# Patient Record
Sex: MALE | Race: WHITE
[De-identification: names, ages, dates, MRNs, and addresses within clinical notes are randomized per-mention and may not be internally consistent; named-entity substitution may affect disease eponyms.]

---

## 2019-10-13 ENCOUNTER — HOSPITAL ENCOUNTER (EMERGENCY)
Dept: HOSPITAL 76 - ED | Age: 24
Discharge: HOME | End: 2019-10-13
Payer: COMMERCIAL

## 2019-10-13 VITALS — SYSTOLIC BLOOD PRESSURE: 134 MMHG | DIASTOLIC BLOOD PRESSURE: 85 MMHG

## 2019-10-13 DIAGNOSIS — H66.003: ICD-10-CM

## 2019-10-13 DIAGNOSIS — J01.00: Primary | ICD-10-CM

## 2019-10-13 PROCEDURE — 99282 EMERGENCY DEPT VISIT SF MDM: CPT

## 2019-10-13 PROCEDURE — 99284 EMERGENCY DEPT VISIT MOD MDM: CPT

## 2019-10-13 NOTE — ED PHYSICIAN DOCUMENTATION
PD HPI URI





- Stated complaint


Stated Complaint: CONGESTION/COUGH/HEAD PRESSURE





- Chief complaint


Chief Complaint: Resp





- History obtained from


History obtained from: Patient, Family





- History of Present Illness


Timing - onset: Yesterday


Timing duration: Days (2)


Timing details: Gradual onset, Still present


Associated symptoms: Fever, Chills, Ear pain, Nasal congestion, Rhinorrhea, 

Sinus pain, Sore throat, Dry cough


Contributing factors: Sick contact


Improves by: Rest, Medication


Worsened by: Activity


Similar symptoms before: Has not had sx before


Recently seen: Not recently seen





- Additional information


Additional information: 


Previously well 24-year-old male has developed a cough and congestion with a 

feeling of cotton in both of his ears sinus pressure and pain and a sore throat.

 He took some Tylenol earlier in the day and he has not taken any since.  He has

developed fever and he aches everywhere.





Review of Systems


Constitutional: reports: Fever, Chills, Myalgias, Fatigue


Eyes: denies: Decreased vision


Ears: reports: Ear pain


Nose: reports: Rhinorrhea / runny nose, Congestion, Sinus pressure / pain


Throat: reports: Sore throat


Cardiac: denies: Chest pain / pressure, Palpitations


Respiratory: reports: Cough.  denies: Dyspnea


GI: denies: Vomiting





PD PAST MEDICAL HISTORY





- Present Medications


Home Medications: 


                                Ambulatory Orders











 Medication  Instructions  Recorded  Confirmed


 


Azithromycin [Zithromax] 250 mg PO DAILY #4 tablet 10/13/19 














- Allergies


Allergies/Adverse Reactions: 


                                    Allergies











Allergy/AdvReac Type Severity Reaction Status Date / Time


 


No Known Drug Allergies Allergy   Verified 10/13/19 00:35














PD ED PE NORMAL





- Vitals


Vital signs reviewed: Yes (febrile and hypertensive)





- General


General: Alert and oriented X 3, No acute distress, Well developed/nourished





- HEENT


HEENT: Atraumatic, PERRL, EOMI, Pharynx benign, Other (both TM's are inflamed 

the right is worse than the left. There is bilat maxillary sinus point 

tenderness. )





- Neck


Neck: Supple, no meningeal sign, No bony TTP





- Cardiac


Cardiac: RRR, No murmur





- Respiratory


Respiratory: No respiratory distress, Clear bilaterally





- Abdomen


Abdomen: Soft, Non tender





- Back


Back: No CVA TTP, No spinal TTP





- Derm


Derm: Normal color, Warm and dry, No rash





- Extremities


Extremities: No deformity, Normal ROM s pain, No edema, No calf tenderness / 

cord





- Neuro


Neuro: Alert and oriented X 3, CNs 2-12 intact, No motor deficit, No sensory 

deficit, Normal speech


Eye Opening: Spontaneous


Motor: Obeys Commands


Verbal: Oriented


GCS Score: 15





- Psych


Psych: Normal mood, Normal affect





Results





- Vitals


Vitals: 





                               Vital Signs - 24 hr











  10/13/19





  00:30


 


Temperature 37.9 C H


 


Heart Rate 100


 


Respiratory 18





Rate 


 


Blood Pressure 143/82 H


 


O2 Saturation 99








                                     Oxygen











O2 Source                      Room air

















PD MEDICAL DECISION MAKING





- ED course


Complexity details: reviewed results, re-evaluated patient, considered 

differential, d/w patient, d/w family


ED course: 





24-year-old male with acute febrile illness has otitis on examination appears to

 have sinusitis with sinus point tenderness as well he is administered Dex 

Methasone 10 mg orally and given 500 mg of a azithromycin as well as Tylenol.  

We will place him on a course of azithromycin.





Departure





- Departure


Disposition: 01 Home, Self Care


Clinical Impression: 


Otitis media


Qualifiers:


 Otitis media type: suppurative Chronicity: acute Laterality: bilateral 

Recurrence: non-recurrent Spontaneous tympanic membrane rupture: without 

spontaneous rupture Qualified Code(s): H66.003 - Acute suppurative otitis media 

without spontaneous rupture of ear drum, bilateral





Sinusitis


Qualifiers:


 Sinusitis location: maxillary Chronicity: acute Recurrence: non-recurrent 

Qualified Code(s): J01.00 - Acute maxillary sinusitis, unspecified





Condition: Stable


Instructions:  ED Sinusitis Abx Tx, ED Otitis Media Acute Adult


Follow-Up: 


[facility] [Provider Group]


Prescriptions: 


Azithromycin [Zithromax] 250 mg PO DAILY #4 tablet


Forms:  Activity restrictions

## 2020-10-27 ENCOUNTER — HOSPITAL ENCOUNTER (OUTPATIENT)
Dept: HOSPITAL 76 - SC | Age: 25
Discharge: HOME | End: 2020-10-27
Attending: INTERNAL MEDICINE
Payer: COMMERCIAL

## 2020-10-27 DIAGNOSIS — G47.10: Primary | ICD-10-CM

## 2020-10-27 DIAGNOSIS — R41.89: ICD-10-CM

## 2020-10-27 DIAGNOSIS — R51.9: ICD-10-CM

## 2020-10-27 DIAGNOSIS — R06.83: ICD-10-CM

## 2020-10-27 DIAGNOSIS — G47.8: ICD-10-CM

## 2020-10-27 NOTE — SLEEP CARE CONSULTATION
Information from patient questionnaire entered by Bree Carter.





I have reviewed and concur with the information entered by Bree Carter. This 

document represents the service I personally performed and the decisions made by

me, Gopi Ortiz MD, [facility].





History of Present Illness


Service Date and Time: 10/27/2020    0940


Reason for Visit: New patient


Chief Complaint: reports: Insomnia, Unrefreshed sleep, Fatigue, Frequent 

awakenings at night


Date of Onset: 


Usual bedtime: constantly changing


Time it takes to fall asleep: 3+ hours


Snores at night: Yes (began 3 months ago)


Observed to quit breathing while asleep: No


Sleeps alone due to snoring: No


Number of times waking at night: every 30-60 minutes


Reasons for waking at night: reports: Other (unknown)


Toss, Turn, or Twitch while sleeping: Yes


Recalls having dreams: Yes


Feels refreshed in the morning: No


Morning headache: Yes


Sleepy or fatigued during the day: Yes


Ever fallen asleep while driving: No


Takes day naps: No


Dreams during day naps: No


Prior sleep studies: No


Additional HPI information: 


I had the pleasure of seeing Mr. Perez today regarding the possibility of him

having a sleep disorder.  As you know, he is a 69 year old gentleman who 

complains of insomnia, unrefreshed sleep, frequent awakenings, and persistent 

fatigue for the past year.  He works swing shift.  The patient tells me that he 

normally goes to bed around 9 am, and it takes him approximately 3 hours to fall

asleep.  He took melatonin.  Ambien worked well but he is out of it.  He has 

been told that he snores loudly and irregularly at night.  He has never been ob

served to stop breathing in his sleep.  He can recall waking up on the average 

of every hour during the night, totaling 30  60 minutes.  Most of the time he 

wakes up because of unknown reasons.  He has never awakened because of his own 

snoring, choking, and having to gasp for air.  There is a lot of tossing and 

turning in his sleep.  No somniloquy (sleep talking) or somnambulism (sleep 

walking).  Generally he can recall having dreams.  In the morning he usually 

gets up out of the bed at variable times between noon and 2 pm. not feeling 

refreshed nor rested.  He usually does have a headache upon waking up.  During 

the day he complains of feeling sleepy and fatigued.  However, his score on 

Cordova Sleepiness Scale is only 1 out of 24.  He has never fallen asleep while 

driving nor has had any accident due to sleepiness.  He usually does not take 

naps during the day.  Upon falling asleep during the day he denies having vivid 

dreams.  He reports having impaired concentration during the day.





PMH: depression





Meds: none


Allergies: no known drug allergies 





Social History: The patient is a /.  He is  and lives in 

[place].  He smoked pipe occasionally.  He drinks alcohol on the average of 

once a week.    





Family History:  His brother has obstructive sleep apnea-hypopnea but not 

treated.








Subjective


Initial Cordova Sleepiness Scale score: 1 (in )





Past Medical History


Past Medical History: reports: Depression





Social History


The patient's occupation is a /. Patient is  and lives in 

[place]. 





Have you smoked in the past 12 months: Yes (Pipe)


Years of smokin


Alcohol use: Yes


Alcohol amount and frequency: 1 on occasion


Caffeine use: Yes


Caffeine amount and frequency: Dr. Pepper, Tea, Coffee





Family History


Family history of sleep disordered breathing: Yes (Every male in family snores)


Family Hx Sleep Apnea: Mother: Snoring, Father: Snoring, Sibling: Sleep apnea - 

Untreated





Allergies and Home Medications


Drug allergies reviewed: Yes (NKDA)


Home medication list reviewed: Yes (none)





Review of Systems


Cardiovascular: denies: high blood pressure, palpitations, chest pain, irregular

heart rate or pulse, leg or foot swelling, have to sleep sitting up, other


Respiratory: denies: shortness of breath, wheeze, sputum production, chronic 

cough, other


Gastrointestinal: denies: heartburn, difficulty swallowing, nausea, vomitting, 

diarrhea, abdominal pain, other


Urinary: denies: incontinence, frequency, urgency, impotence, other


Neurological: denies: headaches, seizure, head trauma, disorientation, speech 

dysfunction, gait or balance problems, fainting or unconsciousness, other


Psychiatric: reports: depression


Ear/Nose/Throat: denies: nasal congestion, sinus problems, nose bleeds, dry 

mouth/throat, hoarseness, injury to nose, tonsillectomy, wisdom teeth removed, 

other


Endocrine: denies: thyroid disease, history of goiter, sluggishness, too hot or 

cold, excessive thirst, increased appetite, increased urination, unexplained 

weakness, other


Musculoskeletal: reports: joint pain, back pain, muscle pain or cramping


Immunologic: denies: sneezing, rash, itching, allergies to food or environment, 

other





Physical Exam


Height: 5 ft 6 in


Weight: 175 lb


Body Mass Index: 28.2


BMI Classification: Overweight





Impression and Plan


IMPRESSION: 


1. Possible Obstructive Sleep Apnea-Hypopnea Syndrome, as suggested by history 

of loud snoring, frequent awakenings during the night, unrefreshed sleep, 

morning headache, cognitive impairment, and daytime hypersomnolence. Narrow 

oropharynx and obesity are common predisposing factors for obstructive sleep 

apnea-hypopnea syndrome.  Pathophysiology of sleep-disordered breathing was 

discussed.  I recommend proceeding to polysomnography to confirm the diagnosis 

and to assess severity.  I informed the patient of what the sleep studies 

involve and after some discussion, he agreed to proceed. 


2.  Shift work sleep disorder.  The patient is working nightshift regularly.  He

does keep regular sleep-wake schedule throughout the week, including the 

weekends.  However, sleeping during the day is prone to disruption.  Melatonin 

is the drug of choice but zolpidem is also reasonable.





Plan:  


1. Schedule an in-laboratory polysomnography.  The sleep study will be performed

during the day to match his sleep-wake schedule and to allow him to go back to 

work in the evening.


2. Return in 1 to 2 weeks after the study to discuss results and initiate 

therapy.





Visit Type: Telehealth Video


Video Type: Dayanara


Patient Location: Home


Location of Provider: Home


Patient agrees and consents to this telehealth visit type: Yes


Patient agrees to have their insurance billed: Yes


Time Spent with Patient (minutes): 20


Provider Statement: I spent 100% of the Telehealth Video Call with the patient 

with greater than 50% spent counseling the patient and coordination of care.

## 2020-11-08 ENCOUNTER — HOSPITAL ENCOUNTER (EMERGENCY)
Dept: HOSPITAL 76 - ED | Age: 25
Discharge: HOME | End: 2020-11-08
Payer: COMMERCIAL

## 2020-11-08 VITALS — SYSTOLIC BLOOD PRESSURE: 131 MMHG | DIASTOLIC BLOOD PRESSURE: 79 MMHG

## 2020-11-08 DIAGNOSIS — B02.9: Primary | ICD-10-CM

## 2020-11-08 PROCEDURE — 99284 EMERGENCY DEPT VISIT MOD MDM: CPT

## 2020-11-08 PROCEDURE — 99282 EMERGENCY DEPT VISIT SF MDM: CPT

## 2020-11-08 NOTE — ED PHYSICIAN DOCUMENTATION
PD HPI SKIN





- Stated complaint


Stated Complaint: BACK RASH





- Chief complaint


Chief Complaint: Wound





- History obtained from


History obtained from: Patient, Family





- Additional information


Additional information: 


Emergency department complaining of a severe burning pain of the skin of his 

back for approximately 6 days, now with a rash.  He states that he began to feel

the sensation over his bilateral shoulder blades, but then it spread down both 

sides of his back.  He is also noticed it wrapping around his flanks.  The 

patient states that he has never had anything like this before.  He denies any 

fevers, chills, or body aches.  No headache.  He states that he saw his doctor, 

who started him on gabapentin, but this did not seem to help.  He states that 

this morning, he had his wife look at his back again and she noticed some red 

bumps.  He states the pain seems to wax and wane a little, but it has been bad 

enough to keep him up at night.  He denies any muscular pain.  No trauma.  He 

has never had a shingles outbreak.  He states he was exposed to varicella when 

he was born, because his mother was infected at that time.  The patient denies 

any itching.  No rash on any of his extremities.  He has not been using any new 

soaps, lotions, or detergents.  He has no history of allergic reaction to 

anything.  No other complaints at this time








Review of Systems


Ten Systems: 10 systems reviewed and negative


Constitutional: reports: Reviewed and negative.  denies: Fever, Chills, Myalgias


Eyes: reports: Reviewed and negative


Ears: reports: Reviewed and negative


Nose: reports: Reviewed and negative.  denies: Rhinorrhea / runny nose


Throat: reports: Reviewed and negative.  denies: Sore throat


Cardiac: reports: Reviewed and negative


Respiratory: reports: Reviewed and negative


GI: reports: Reviewed and negative.  denies: Abdominal Pain


: reports: Reviewed and negative


Skin: reports: Rash, Other (Skin pain)


Musculoskeletal: reports: Reviewed and negative


Neurologic: reports: Reviewed and negative.  denies: Headache


Psychiatric: reports: Reviewed and negative


Endocrine: reports: Reviewed and negative


Immunocompromised: reports: Reviewed and negative





PD PAST MEDICAL HISTORY





- Present Medications


Home Medications: 


                                Ambulatory Orders











 Medication  Instructions  Recorded  Confirmed


 


Azithromycin [Zithromax] 250 mg PO DAILY #4 tablet 10/13/19 


 


Acyclovir [Zovirax] 400 mg PO 5XD 5 Days #5 capsule 11/08/20 


 


predniSONE [Deltasone] 60 mg PO DAILY 5 Days #15 tablet 11/08/20 














- Allergies


Allergies/Adverse Reactions: 


                                    Allergies











Allergy/AdvReac Type Severity Reaction Status Date / Time


 


No Known Drug Allergies Allergy   Verified 11/08/20 07:54














PD ED PE NORMAL





- Vitals


Vital signs reviewed: Yes





- General


General: Alert and oriented X 3, No acute distress





- HEENT


HEENT: Atraumatic, PERRL, EOMI, Moist mucous membranes





- Neck


Neck: Supple, no meningeal sign





- Respiratory


Respiratory: No respiratory distress





- Derm


Derm: Warm and dry, Other (She has extensive scarring without pock marking on 

his back, consistent with his history of acne as a teenager.  Scant, scattered 

macules and papules with 2 pustules on right upper back.  No distinct nerve root

 pattern.)





- Extremities


Extremities: No deformity





- Neuro


Neuro: Alert and oriented X 3, CNs 2-12 intact, Normal speech, Other (Grossly 

intact)





- Psych


Psych: Normal mood, Normal affect





Results





- Vitals


Vitals: 





                               Vital Signs - 24 hr











  11/08/20





  07:51


 


Temperature 36.8 C


 


Heart Rate 64


 


Respiratory 20





Rate 


 


Blood Pressure 131/79 H


 


O2 Saturation 98








                                     Oxygen











O2 Source                      Room air

















PD MEDICAL DECISION MAKING





- ED course


Complexity details: considered differential, d/w patient, d/w family


ED course: 


I discussed with the patient that I am not exactly sure what is causing his skin

 pain and rash.  Certainly, the description of the pain and burning for several 

days, leading up to an outbreak of rash is concerning for shingles; however, the

 rash at this point does not appear typical of shingles, and it would be highly 

unusual for the outbreak to involve multiple nerve roots in a young, otherwise 

healthy patient.  At this time, given that symptoms have been going on for 

almost a week, I have discussed the option with the patient of trying a course 

of acyclovir and prednisone to see if this helps.  I cannot be certain that the 

patient has shingles and as stated, it would be in atypical case of so.  

However, the rash does not appear to be an allergic reaction and does not sound 

typical of this either.  We discussed the need for primary care and potentially,

 dermatology follow-up, if the patient's symptoms are not better within the 

week.  Patient states that he is well established with his primary care 

physician at the [facility] and that they have already placed a dermatology referral. 

 We have discussed the usual indications for return.








Departure





- Departure


Disposition: 01 Home, Self Care


Clinical Impression: 


Zoster


Qualifiers:


 Herpes zoster complications: without complications Qualified Code(s): B02.9 - 

Zoster without complications





Condition: Stable


Instructions:  ED Shingles


Prescriptions: 


predniSONE [Deltasone] 60 mg PO DAILY 5 Days #15 tablet


Acyclovir [Zovirax] 400 mg PO 5XD 5 Days #5 capsule


Comments: 


It is not clear exactly what is causing your symptoms.  Your rash is very mild 

and does not appear typical of either shingles or an allergic reaction.  H

owever, with the intense pain and burning that you have been having for several 

days before the rash, there is certainly concern for an atypical form of 

shingles, as the illness often starts with the kind of pain you are describing 

before rash outbreak occurs.  This point in time, the best course is probably to

 treat you with the antiviral acyclovir and a short course of an anti-

inflammatory steroid.  If you continue to have symptoms for more than the next 

week, you will need to follow-up with your doctor and potentially, dermatology. 

 You may use ibuprofen and Tylenol to help with the discomfort, along with the 

other medications you are on.


Forms:  Activity restrictions

## 2020-12-09 ENCOUNTER — HOSPITAL ENCOUNTER (OUTPATIENT)
Dept: HOSPITAL 76 - SC | Age: 25
Discharge: HOME | End: 2020-12-09
Attending: INTERNAL MEDICINE
Payer: COMMERCIAL

## 2020-12-09 DIAGNOSIS — E66.3: ICD-10-CM

## 2020-12-09 DIAGNOSIS — G47.61: Primary | ICD-10-CM

## 2020-12-09 PROCEDURE — 95810 POLYSOM 6/> YRS 4/> PARAM: CPT

## 2020-12-17 ENCOUNTER — HOSPITAL ENCOUNTER (OUTPATIENT)
Dept: HOSPITAL 76 - SC | Age: 25
Discharge: HOME | End: 2020-12-17
Attending: NURSE PRACTITIONER
Payer: COMMERCIAL

## 2020-12-17 DIAGNOSIS — G47.61: ICD-10-CM

## 2020-12-17 DIAGNOSIS — R06.83: Primary | ICD-10-CM

## 2020-12-17 NOTE — SLEEP CARE CONSULTATION
Information from patient questionnaire entered by Danny Lopes.





I have reviewed and concur with the information entered by Danny Lopes. This 

document represents the service I personally performed and the decisions made by

me, Aaliyah Navarro ARNP.





History of Present Illness


Service Date and Time: 12/17/2020    1700


Initial Sibley Sleepiness Scale score: 1 (in 2020)


Current Sibley Sleepiness Scale score: 10


Additional HPI information: 





SUNIL BRITO returns via Telehealth vist for follow up and results of the 

recently performed polysomnography.





The patient was informed of the following findings: patient has no significant 

sleep disordered breathing with an average AHI of 0.3 and kristyn oxygen 

saturation of 91%. He did have moderate PLMs contributing to sleep 

fragmentation.





I explained the pathophysiology behind obstructive sleep apnea. Patient does not

have sleep apnea and was advised how weight gain could increase the risk of 

developing sleep apnea in the future. Patient has light to loud snoring. Snoring

can be reduced by weight loss. Weight loss is best achieved with diet consult. 

Patient instructed to contact PCP for referral. Snoring can also be treated with

an oral appliance from a dentist. Advised to check insurance coverage. In 

addition, an ENT evaluation can be do to see if other treatment is indicated. 

Patient counseled not drink alcohol less than 4 hours before bedtime as it can 

increase snoring and apnea.  Patient was cautioned about risks of drowsy driving

until sleepiness symptoms resolve. 





Sleep Study





- Results


Type of Sleep Study: Polysomnography


Prior sleep studies: No


Polysomnography/Home Sleep Study results: 





IMPRESSION: The quality of the study is good. The patient had normal sleep 

efficiency. The sleep architecture


was abnormal for mild sleep fragmentation and reduced amount of time spent in 

slow wave sleep (N3). Respiratory


monitoring showed no significant sleep disordered breathing sleep-disordered 

breathing (AHI = 0.3) or hypoxia


(kristyn oxygen saturation of 91%). The patient slept adequately in supine 

position (supine AHI = 0.4; non-supine =


0.00). Snore was light to loud in intensity. There was moderate periodic leg 

movement of sleep contributing to the


sleep fragmentation. Cardiac rhythm was normal sinus rhythm without significant 

arrhythmia. No abnormal


behavior (parasomnia) observed during the night.





Allergies and Home Medications


Drug allergies reviewed: Yes (NKDA)


Home medication list reviewed: Yes (no changes)





Review of Systems


Review of systems same as previous: Yes (no changes)





Physical Exam


Vital signs obtained and entered by: Telehealth visit to limit exposure during 

Covid pandemic


Height: 5 ft 6 in





Impression and Plan





1. Snoring but no significant sleep disordered breathing. Patient advised that 

often weight loss will reduce snoring as well as apnea risk. An oral appliance 

can also be used for snoring. This would require a dental consultation. Patient 

cautioned not to use other online appliances as can cause bite issues. A list of

accredited dentists in area and one local dentist who makes oral appliances 

given. Patient is advised to check if insurance will cover. An ENT consult can 

also be helpful to determine if any other treatment is an option.





2. Periodic limb movement, moderate, that did add to fragmentation of the 

patients sleep. Periodic limb movement of sleep (PLMS) is characterized by 

episodes of repetitive limb movements that occur during sleep and usually 

involve the lower limbs. The etiology is unknown but can be associated with 

restless leg syndrome (RLS), neuropathy, spinal cord diseases, kidney disease, 

rheumatological disorders, narcolepsy, obstructive sleep apnea, and REM sleep 

behavior disorder. Other factors that can increase PLMS and/or RLS are heredity 

and iron deficiency as reflected by a low serum ferritin level below 50 to 75mcg

/ L. Several medications can precipitate or aggravate PLMS such as selective 

serotonin re-uptake inhibitor antidepressants, tricyclic antidepressants, 

lithium, and dopamine  receptor antagonists with the exception of bupropion. 

Caffeine can also aggravate PLMS and should be avoided. Sleep hygiene methods 

can also improve sleep as well as lifestyle changes such as regular exercise. 

Patient was advised that further evaluation may be indicated.





------Patient had concern about feeling like he is "consciously awake with his 

eyes closed". He feels like his "body is rested" when he wakes up but his "mind 

is still tired". He states he knows he is sleeping but still feels he is 

conscious while laying there in his bed. He did reach REM sleep quickly in the 

sleep study. He states that when he was being prepared for the test he was 

sitting with his eyes closed due to the bright lights. The technician left room 

and when he came back he commented that he had got a little nap but he states he

feels he was awake the whole time the technician was gone from the room. I 

discussed with patient that I will consult with Dr. Schneider about this and get back 

to him about his concerns with Dr. Schneider's feedback. He voiced understanding.





* Follow up with PCP for moderate PLMs as needed


* I will consult with Medical director about feeling awake when sleeping


* Avoid alcohol consumption near bedtime


* The patient is cautioned about driving until sleepiness is completely 

  resolved.


* Return as needed to office.


Counseling Topics: Weight loss health impact


Follow up with: PCP


Follow up recommended for: PLMS


Visit Type: Telehealth Video


Video Type: Doximity


Patient Location: Home


Location of Provider: Office


Patient agrees and consents to this telehealth visit type: Yes


Patient agrees to have their insurance billed: Yes


Time Spent with Patient (minutes): 17


Provider Statement: I spent 100% of the Telehealth Video Call with the patient 

with greater than 50% spent counseling the patient and coordination of care.

## 2020-12-27 ENCOUNTER — HOSPITAL ENCOUNTER (EMERGENCY)
Dept: HOSPITAL 76 - ED | Age: 25
Discharge: HOME | End: 2020-12-27
Payer: COMMERCIAL

## 2020-12-27 VITALS — SYSTOLIC BLOOD PRESSURE: 142 MMHG | DIASTOLIC BLOOD PRESSURE: 80 MMHG

## 2020-12-27 DIAGNOSIS — R10.30: Primary | ICD-10-CM

## 2020-12-27 DIAGNOSIS — D72.829: ICD-10-CM

## 2020-12-27 DIAGNOSIS — M54.5: ICD-10-CM

## 2020-12-27 DIAGNOSIS — R11.2: ICD-10-CM

## 2020-12-27 LAB
ALBUMIN DIAFP-MCNC: 4.9 G/DL (ref 3.2–5.5)
ALBUMIN/GLOB SERPL: 1.7 {RATIO} (ref 1–2.2)
ALP SERPL-CCNC: 96 IU/L (ref 42–121)
ALT SERPL W P-5'-P-CCNC: 44 IU/L (ref 10–60)
ANION GAP SERPL CALCULATED.4IONS-SCNC: 10 MMOL/L (ref 6–13)
AST SERPL W P-5'-P-CCNC: 28 IU/L (ref 10–42)
BASOPHILS NFR BLD AUTO: 0 10^3/UL (ref 0–0.1)
BASOPHILS NFR BLD AUTO: 0.3 %
BILIRUB BLD-MCNC: 0.7 MG/DL (ref 0.2–1)
BUN SERPL-MCNC: 13 MG/DL (ref 6–20)
CALCIUM UR-MCNC: 9.6 MG/DL (ref 8.5–10.3)
CHLORIDE SERPL-SCNC: 103 MMOL/L (ref 101–111)
CLARITY UR REFRACT.AUTO: CLEAR
CO2 SERPL-SCNC: 26 MMOL/L (ref 21–32)
CREAT SERPLBLD-SCNC: 1.1 MG/DL (ref 0.6–1.2)
EOSINOPHIL # BLD AUTO: 0.3 10^3/UL (ref 0–0.7)
EOSINOPHIL NFR BLD AUTO: 2.3 %
ERYTHROCYTE [DISTWIDTH] IN BLOOD BY AUTOMATED COUNT: 11.6 % (ref 12–15)
GLOBULIN SER-MCNC: 2.9 G/DL (ref 2.1–4.2)
GLUCOSE SERPL-MCNC: 102 MG/DL (ref 70–100)
GLUCOSE UR QL STRIP.AUTO: NEGATIVE MG/DL
HGB UR QL STRIP: 15.1 G/DL (ref 14–18)
KETONES UR QL STRIP.AUTO: NEGATIVE MG/DL
LIPASE SERPL-CCNC: 30 U/L (ref 22–51)
LYMPHOCYTES # SPEC AUTO: 2.9 10^3/UL (ref 1.5–3.5)
LYMPHOCYTES NFR BLD AUTO: 20.7 %
MCH RBC QN AUTO: 32.4 PG (ref 27–31)
MCHC RBC AUTO-ENTMCNC: 34.5 G/DL (ref 32–36)
MCV RBC AUTO: 94 FL (ref 80–94)
MONOCYTES # BLD AUTO: 0.9 10^3/UL (ref 0–1)
MONOCYTES NFR BLD AUTO: 6.3 %
NEUTROPHILS # BLD AUTO: 9.9 10^3/UL (ref 1.5–6.6)
NEUTROPHILS # SNV AUTO: 14.1 X10^3/UL (ref 4.8–10.8)
NEUTROPHILS NFR BLD AUTO: 70.1 %
NITRITE UR QL STRIP.AUTO: NEGATIVE
PDW BLD AUTO: 10.2 FL (ref 7.4–11.4)
PH UR STRIP.AUTO: 6 PH (ref 5–7.5)
PLATELET # BLD: 241 10^3/UL (ref 130–450)
PROT SPEC-MCNC: 7.8 G/DL (ref 6.7–8.2)
PROT UR STRIP.AUTO-MCNC: (no result) MG/DL
RBC # UR STRIP.AUTO: NEGATIVE /UL
RBC MAR: 4.66 10^6/UL (ref 4.7–6.1)
SODIUM SERPLBLD-SCNC: 139 MMOL/L (ref 135–145)
SP GR UR STRIP.AUTO: 1.01 (ref 1–1.03)
UROBILINOGEN UR QL STRIP.AUTO: (no result) E.U./DL
UROBILINOGEN UR STRIP.AUTO-MCNC: NEGATIVE MG/DL

## 2020-12-27 PROCEDURE — 36415 COLL VENOUS BLD VENIPUNCTURE: CPT

## 2020-12-27 PROCEDURE — 81003 URINALYSIS AUTO W/O SCOPE: CPT

## 2020-12-27 PROCEDURE — 96374 THER/PROPH/DIAG INJ IV PUSH: CPT

## 2020-12-27 PROCEDURE — 99285 EMERGENCY DEPT VISIT HI MDM: CPT

## 2020-12-27 PROCEDURE — 81001 URINALYSIS AUTO W/SCOPE: CPT

## 2020-12-27 PROCEDURE — 83690 ASSAY OF LIPASE: CPT

## 2020-12-27 PROCEDURE — 87086 URINE CULTURE/COLONY COUNT: CPT

## 2020-12-27 PROCEDURE — 80053 COMPREHEN METABOLIC PANEL: CPT

## 2020-12-27 PROCEDURE — 85025 COMPLETE CBC W/AUTO DIFF WBC: CPT

## 2020-12-27 PROCEDURE — 74176 CT ABD & PELVIS W/O CONTRAST: CPT

## 2020-12-27 PROCEDURE — 96375 TX/PRO/DX INJ NEW DRUG ADDON: CPT

## 2020-12-27 NOTE — ED PHYSICIAN DOCUMENTATION
PD HPI ABD PAIN





- Stated complaint


Stated Complaint: ABD/BACK PX





- Chief complaint


Chief Complaint: Abd Pain





- History obtained from


History obtained from: Patient





- Additional information


Additional information: 


Patient comes emergency department complaining of onset around 2100 this evening

of back pain which is grown steadily worse. Patient states that he feels the 

pain in both sides of his low back but especially on the left side and that it 

seems to radiate through to the front of his abdomen. He states he feels 

phleboliths and left flank pain. Patient denies any change in bowel habits. He h

as been nauseated but has not vomited. No fevers or chills. No dysuria or blood 

in the urine. No scrotal or testicular complaints. No history of kidney stones. 

Patient has had no abdominal surgeries. He states that the pain grew steadily 

worse and that it seems to come in waves. No recent back injury. No other 

complaints at this time.








Review of Systems


Ten Systems: 10 systems reviewed and negative


Constitutional: reports: Reviewed and negative


Eyes: reports: Reviewed and negative


Ears: reports: Reviewed and negative


Nose: reports: Reviewed and negative


Throat: reports: Reviewed and negative


Cardiac: reports: Reviewed and negative


Respiratory: reports: Reviewed and negative


GI: reports: Abdominal Pain, Nausea


: reports: Reviewed and negative.  denies: Dysuria, Hematuria


Skin: reports: Reviewed and negative


Musculoskeletal: reports: Back pain


Neurologic: reports: Reviewed and negative


Psychiatric: reports: Reviewed and negative


Endocrine: reports: Reviewed and negative


Immunocompromised: reports: Reviewed and negative





PD PAST MEDICAL HISTORY





- Past Medical History


Past Medical History: Yes


Neuro: Migraines





- Past Surgical History


Past Surgical History: No





- Present Medications


Home Medications: 


                                Ambulatory Orders











 Medication  Instructions  Recorded  Confirmed


 


HYDROcod/ACETAM 5/325 [Norco 5/325] 1 ea PO Q6H PRN #6 tablet 12/27/20 














- Allergies


Allergies/Adverse Reactions: 


                                    Allergies











Allergy/AdvReac Type Severity Reaction Status Date / Time


 


No Known Drug Allergies Allergy   Verified 12/27/20 00:18














- Social History


Does the pt smoke?: No


Smoking Status: Never smoker


Does the pt drink ETOH?: Yes


Does the pt have substance abuse?: No





- Immunizations


Immunizations are current?: Yes





- POLST


Patient has POLST: No





PD ED PE NORMAL





- Vitals


Vital signs reviewed: Yes





- General


General: Alert and oriented X 3, Other (Patient appears visibly uncomfortable 

and is groaning and writhing on the bed.)





- HEENT


HEENT: Atraumatic, PERRL, EOMI, Moist mucous membranes





- Neck


Neck: Supple, no meningeal sign





- Cardiac


Cardiac: RRR, No murmur, Strong equal pulses





- Respiratory


Respiratory: No respiratory distress, Clear bilaterally





- Abdomen


Abdomen: Soft, Non tender, Non distended





- Back


Back: No CVA TTP, No spinal TTP





- Derm


Derm: Normal color, Warm and dry, No rash





- Extremities


Extremities: No deformity, No edema, No calf tenderness / cord





- Neuro


Neuro: Alert and oriented X 3





- Psych


Psych: Normal mood, Normal affect





Results





- Vitals


Vitals: 


                               Vital Signs - 24 hr











  12/27/20 12/27/20 12/27/20





  00:16 00:18 02:18


 


Temperature 36.9 C 36.9 C 36.8 C


 


Heart Rate 72 72 74


 


Respiratory 18 18 16





Rate   


 


Blood Pressure 154/94 H 154/94 H 148/88 H


 


O2 Saturation 97 97 98








                                     Oxygen











O2 Source                      Room air

















- Labs


Labs: 


                                Laboratory Tests











  12/27/20 12/27/20 12/27/20





  00:25 00:25 02:15


 


WBC  14.1 H  


 


RBC  4.66 L  


 


Hgb  15.1  


 


Hct  43.8  


 


MCV  94.0  


 


MCH  32.4 H  


 


MCHC  34.5  


 


RDW  11.6 L  


 


Plt Count  241  


 


MPV  10.2  


 


Neut # (Auto)  9.9 H  


 


Lymph # (Auto)  2.9  


 


Mono # (Auto)  0.9  


 


Eos # (Auto)  0.3  


 


Baso # (Auto)  0.0  


 


Absolute Nucleated RBC  0.00  


 


Nucleated RBC %  0.0  


 


Sodium   139 


 


Potassium   3.6 


 


Chloride   103 


 


Carbon Dioxide   26 


 


Anion Gap   10.0 


 


BUN   13 


 


Creatinine   1.1 


 


Estimated GFR (MDRD)   82 L 


 


Glucose   102 H 


 


Calcium   9.6 


 


Total Bilirubin   0.7 


 


AST   28 


 


ALT   44 


 


Alkaline Phosphatase   96 


 


Total Protein   7.8 


 


Albumin   4.9 


 


Globulin   2.9 


 


Albumin/Globulin Ratio   1.7 


 


Lipase   30 


 


Urine Color    YELLOW


 


Urine Clarity    CLEAR


 


Urine pH    6.0


 


Ur Specific Gravity    1.010


 


Urine Protein    TRACE


 


Urine Glucose (UA)    NEGATIVE


 


Urine Ketones    NEGATIVE


 


Urine Occult Blood    NEGATIVE


 


Urine Nitrite    NEGATIVE


 


Urine Bilirubin    NEGATIVE


 


Urine Urobilinogen    0.2 (NORMAL)


 


Ur Leukocyte Esterase    NEGATIVE


 


Ur Microscopic Review    NOT INDICATED


 


Urine Culture Comments    NOT INDICATED














- Rads (name of study)


  ** Ct abd/pelvis


Radiology: Final report received, EMP read indepedently, See rad report (neg)





PD MEDICAL DECISION MAKING





- ED course


Complexity details: reviewed results, re-evaluated patient, considered 

differential, d/w patient


ED course: 


The patient was treated symptomatically with IV Toradol, Zofran, and Dilaudid.  

He was found to be feeling much better, though the nausea did recur later.  

Laboratory studies showed a mildly elevated white blood cell count, but a 

negative urine and CT scan of the abdomen and pelvis without contrast was neg

ative.  I had suspected most probably a kidney stone in this patient and was 

surprised to find the CT completely negative, given the degree of discomfort the

patient seems to be having.  I discussed with the patient that his work-up had 

been negative.  The patient had begun to have an increase in nausea again and 

did actually vomit during our conversation.  At this point, he was given an IV 

dose of Phenergan, but I did not feel that this would  as this 

her work-up had been nondiagnostic.  I discussed with the patient that if his 

pain recurs to the degree that he was having it before, or if he develops 

fevers, he should return to the emergency department.  The patient does not have

any testicular or scrotal complaints, despite specific questioning, and I have 

discussed with him that while testicular torsion is something that has been 

considered, this Does not seem likely in light of the lack of specific symptoms 

and patient.  We have discussed the usual indications for return.








Departure





- Departure


Disposition: 01 Home, Self Care


Clinical Impression: 


Abdominal pain


Qualifiers:


 Abdominal location: lower abdomen, unspecified Qualified Code(s): R10.30 - 

Lower abdominal pain, unspecified





Back pain


Qualifiers:


 Back pain location: low back pain Chronicity: acute Back pain laterality: left 

Sciatica presence: without sciatica Qualified Code(s): M54.5 - Low back pain





Condition: Stable


Instructions:  ED Abdominal Pain Unkn Cause, ED Spasm Back No Trauma


Prescriptions: 


HYDROcod/ACETAM 5/325 [Norco 5/325] 1 ea PO Q6H PRN #6 tablet


 PRN Reason: Pain


Comments: 


Your labs show a mild elevation of your white blood cell count, but otherwise 

are unremarkable.  Your urinalysis is totally normal and your CT scan, which is 

very detailed, did not show any abnormalities.  It is not exactly clear why you 

have had the symptoms that you have tonight.  It is possible that you had a 

small kidney stone which passed, and caused a lot of pain and spasms through out

the ureter, which is the tube that goes between the kidney and bladder.  If this

is the case, the stone had already been expelled from your bladder by the time 

of CT scan.  There is no evidence of appendicitis or infection of your bowel 

wall as a cause of the pain.  There is no blockage or other emergent condition. 

It is possible that you could have had a spasm of the deep musculature of your 

back, which can cause quite a bit of discomfort.  At this time, it is not 

exactly clear what has caused your pain, but no emergent condition has been 

identified.  If you are scheduled to work tomorrow, you may take the day off to 

rest and recover.  If you have recurrence of your pain, especially with fever, 

please return to the emergency department.

## 2020-12-27 NOTE — CT REPORT
PROCEDURE:  Abdomen/Pelvis WO

 

INDICATIONS:  back/L flank/low abd pain

 

TECHNIQUE:  

Noncontrast 5 mm thick sections acquired from the diaphragms to the symphysis.  5 mm coronal and sagi
ttal reformats were then performed.  For radiation dose reduction, the following was used:  automated
 exposure control, adjustment of mA and/or kV according to patient size.

 

COMPARISON:  None.

 

FINDINGS:  

Image quality:  Excellent.  

 

ABDOMEN:  

Lung bases:  Lung bases are clear.  Heart size is normal.  

 

Solid organs:  Liver and spleen are normal in size.  Gallbladder is unremarkable  Pancreas is normal 
in contours.  No adrenal nodules.  Kidneys are normal in size, without hydronephrosis or nephrolithia
sis.  

 

Peritoneum and bowel:  Unenhanced bowel loops demonstrate normal wall thickness and caliber.  The taina
endix is thin-walled and gas-filled. No free fluid or air.  

 

Nodes and vessels:  No retroperitoneal or mesenteric adenopathy by size criteria.  Aorta and inferior
 vena cava are normal in caliber.  

 

Miscellaneous:  No ventral hernias.  

 

 

PELVIS:  

Genitourinary:  Bladder wall thickness is normal.  

 

Miscellaneous:  No inguinal hernias or adenopathy.  

 

Bones:  No suspicious bony lesions.  No vertebral body compression fractures.  Intraosseous hemangiom
as noted at L3.

 

IMPRESSION:  

 

1. No acute intra-abdominal findings. Normal appendix.

 

Findings are consistent with the overnight interpretation.

 

 

Reviewed by: Marlen Scott MD on 12/27/2020 8:08 AM [place]

Approved by: Marlen Scott MD on 12/27/2020 8:08 AM [facility]

 

 

Station ID:  [id number]

## 2022-04-10 ENCOUNTER — HOSPITAL ENCOUNTER (EMERGENCY)
Dept: HOSPITAL 76 - ED | Age: 27
Discharge: HOME | End: 2022-04-10
Payer: COMMERCIAL

## 2022-04-10 VITALS — SYSTOLIC BLOOD PRESSURE: 130 MMHG | DIASTOLIC BLOOD PRESSURE: 80 MMHG

## 2022-04-10 DIAGNOSIS — K29.20: ICD-10-CM

## 2022-04-10 DIAGNOSIS — F17.200: ICD-10-CM

## 2022-04-10 DIAGNOSIS — F10.129: Primary | ICD-10-CM

## 2022-04-10 LAB
ALBUMIN DIAFP-MCNC: 5.1 G/DL (ref 3.2–5.5)
ALBUMIN/GLOB SERPL: 1.6 {RATIO} (ref 1–2.2)
ALP SERPL-CCNC: 74 IU/L (ref 42–121)
ALT SERPL W P-5'-P-CCNC: 59 IU/L (ref 10–60)
ANION GAP SERPL CALCULATED.4IONS-SCNC: 11 MMOL/L (ref 6–13)
AST SERPL W P-5'-P-CCNC: 66 IU/L (ref 10–42)
BASOPHILS NFR BLD AUTO: 0 10^3/UL (ref 0–0.1)
BASOPHILS NFR BLD AUTO: 0.2 %
BILIRUB BLD-MCNC: 0.8 MG/DL (ref 0.2–1)
BUN SERPL-MCNC: 12 MG/DL (ref 6–20)
CALCIUM UR-MCNC: 9.7 MG/DL (ref 8.5–10.3)
CHLORIDE SERPL-SCNC: 98 MMOL/L (ref 101–111)
CLARITY UR REFRACT.AUTO: CLEAR
CO2 SERPL-SCNC: 26 MMOL/L (ref 21–32)
CREAT SERPLBLD-SCNC: 0.6 MG/DL (ref 0.6–1.2)
EOSINOPHIL # BLD AUTO: 0.1 10^3/UL (ref 0–0.7)
EOSINOPHIL NFR BLD AUTO: 0.6 %
ERYTHROCYTE [DISTWIDTH] IN BLOOD BY AUTOMATED COUNT: 12 % (ref 12–15)
GFRSERPLBLD MDRD-ARVRAT: 163 ML/MIN/{1.73_M2} (ref 89–?)
GLOBULIN SER-MCNC: 3.1 G/DL (ref 2.1–4.2)
GLUCOSE SERPL-MCNC: 107 MG/DL (ref 70–100)
GLUCOSE UR QL STRIP.AUTO: NEGATIVE MG/DL
HCT VFR BLD AUTO: 43.5 % (ref 42–52)
HGB UR QL STRIP: 15 G/DL (ref 14–18)
KETONES UR QL STRIP.AUTO: 15 MG/DL
LIPASE SERPL-CCNC: 31 U/L (ref 22–51)
LYMPHOCYTES # SPEC AUTO: 2.1 10^3/UL (ref 1.5–3.5)
LYMPHOCYTES NFR BLD AUTO: 18.8 %
MCH RBC QN AUTO: 31.8 PG (ref 27–31)
MCHC RBC AUTO-ENTMCNC: 34.5 G/DL (ref 32–36)
MCV RBC AUTO: 92.2 FL (ref 80–94)
MONOCYTES # BLD AUTO: 0.5 10^3/UL (ref 0–1)
MONOCYTES NFR BLD AUTO: 4.7 %
NEUTROPHILS # BLD AUTO: 8.3 10^3/UL (ref 1.5–6.6)
NEUTROPHILS # SNV AUTO: 10.9 X10^3/UL (ref 4.8–10.8)
NEUTROPHILS NFR BLD AUTO: 75.4 %
NITRITE UR QL STRIP.AUTO: NEGATIVE
NRBC # BLD AUTO: 0 /100WBC
NRBC # BLD AUTO: 0 X10^3/UL
PDW BLD AUTO: 9.7 FL (ref 7.4–11.4)
PH UR STRIP.AUTO: 7.5 PH (ref 5–7.5)
PLATELET # BLD: 250 10^3/UL (ref 130–450)
POTASSIUM SERPL-SCNC: 3.8 MMOL/L (ref 3.5–5)
PROT SPEC-MCNC: 8.2 G/DL (ref 6.7–8.2)
PROT UR STRIP.AUTO-MCNC: NEGATIVE MG/DL
RBC # UR STRIP.AUTO: NEGATIVE /UL
RBC MAR: 4.72 10^6/UL (ref 4.7–6.1)
SODIUM SERPLBLD-SCNC: 135 MMOL/L (ref 135–145)
SP GR UR STRIP.AUTO: 1.01 (ref 1–1.03)
UROBILINOGEN UR QL STRIP.AUTO: (no result) E.U./DL
UROBILINOGEN UR STRIP.AUTO-MCNC: NEGATIVE MG/DL

## 2022-04-10 PROCEDURE — 96375 TX/PRO/DX INJ NEW DRUG ADDON: CPT

## 2022-04-10 PROCEDURE — 87086 URINE CULTURE/COLONY COUNT: CPT

## 2022-04-10 PROCEDURE — 81001 URINALYSIS AUTO W/SCOPE: CPT

## 2022-04-10 PROCEDURE — 81003 URINALYSIS AUTO W/O SCOPE: CPT

## 2022-04-10 PROCEDURE — 83690 ASSAY OF LIPASE: CPT

## 2022-04-10 PROCEDURE — 99282 EMERGENCY DEPT VISIT SF MDM: CPT

## 2022-04-10 PROCEDURE — 80053 COMPREHEN METABOLIC PANEL: CPT

## 2022-04-10 PROCEDURE — 36415 COLL VENOUS BLD VENIPUNCTURE: CPT

## 2022-04-10 PROCEDURE — 85025 COMPLETE CBC W/AUTO DIFF WBC: CPT

## 2022-04-10 PROCEDURE — 96374 THER/PROPH/DIAG INJ IV PUSH: CPT

## 2022-04-10 NOTE — ED PHYSICIAN DOCUMENTATION
History of Present Illness





- Stated complaint


Stated Complaint: VOMIT/LETHARGIC/CHILLS/FEVER





- Chief complaint


Chief Complaint: Abd Pain





- History obtained from


History obtained from: Patient





- History of Present Illness


Timing: Today





- Additonal information


Additional information: 





27-year-old male comes in to the emergency department complaint of abdominal neel

n, nausea, vomiting.  He states he drank a large amount of alcohol last night 

and is not feeling well today.  Nothing makes it better.  Worse when trying to 

eat and drink.  No diarrhea.  No constipation.  Has not had similar symptoms 

previously.  Mild headache, mild light sensitivity.





Review of Systems


Constitutional: denies: Fever, Chills


Throat: denies: Sore throat


Cardiac: denies: Chest pain / pressure


Respiratory: denies: Cough


GI: reports: Abdominal Pain (Epigastric, dull, burning), Nausea, Vomiting


Skin: denies: Rash


Musculoskeletal: denies: Neck pain, Back pain





PD PAST MEDICAL HISTORY





- Past Medical History


Cardiovascular: None


Respiratory: None


Neuro: Migraines


Endocrine/Autoimmune: None


GI: None


: None


HEENT: None


Psych: None


Musculoskeletal: None


Derm: None





- Past Surgical History


Past Surgical History: No





- Present Medications


Home Medications: 


                                Ambulatory Orders











 Medication  Instructions  Recorded  Confirmed


 


Ondansetron Odt [Zofran] 4 mg TL Q6H PRN #10 tablet 04/10/22 














- Allergies


Allergies/Adverse Reactions: 


                                    Allergies











Allergy/AdvReac Type Severity Reaction Status Date / Time


 


No Known Drug Allergies Allergy   Verified 04/10/22 17:04














- Social History


Does the pt smoke?: Yes


Smoking Status: Current some day smoker


Does the pt drink ETOH?: Yes


Does the pt have substance abuse?: No





- Immunizations


Immunizations are current?: Yes





- POLST


Patient has POLST: No





PD ED PE NORMAL





- Vitals


Vital signs reviewed: Yes





- General


General: Alert and oriented X 3, No acute distress





- HEENT


HEENT: PERRL, Moist mucous membranes





- Neck


Neck: Supple, no meningeal sign





- Cardiac


Cardiac: RRR, Strong equal pulses





- Respiratory


Respiratory: No respiratory distress, Clear bilaterally





- Abdomen


Abdomen: Soft, Non tender, Non distended





- Back


Back: No CVA TTP, No spinal TTP





- Derm


Derm: Warm and dry





- Neuro


Neuro: Alert and oriented X 3





- Psych


Psych: Normal mood, Normal affect





Results





- Vitals


Vitals: 


                               Vital Signs - 24 hr











  04/10/22





  17:04


 


Temperature 36.7 C


 


Heart Rate 72


 


Respiratory 18





Rate 


 


Blood Pressure 122/71


 


O2 Saturation 97








                                     Oxygen











O2 Source                      Room air

















- Labs


Labs: 


                                Laboratory Tests











  04/10/22 04/10/22





  17:20 17:20


 


WBC  10.9 H 


 


RBC  4.72 


 


Hgb  15.0 


 


Hct  43.5 


 


MCV  92.2 


 


MCH  31.8 H 


 


MCHC  34.5 


 


RDW  12.0 


 


Plt Count  250 


 


MPV  9.7 


 


Neut # (Auto)  8.3 H 


 


Lymph # (Auto)  2.1 


 


Mono # (Auto)  0.5 


 


Eos # (Auto)  0.1 


 


Baso # (Auto)  0.0 


 


Absolute Nucleated RBC  0.00 


 


Nucleated RBC %  0.0 


 


Sodium   135


 


Potassium   3.8


 


Chloride   98 L


 


Carbon Dioxide   26


 


Anion Gap   11.0


 


BUN   12


 


Creatinine   0.6


 


Estimated GFR (MDRD)   163


 


Glucose   107 H


 


Calcium   9.7


 


Total Bilirubin   0.8


 


AST   66 H


 


ALT   59


 


Alkaline Phosphatase   74


 


Total Protein   8.2


 


Albumin   5.1


 


Globulin   3.1


 


Albumin/Globulin Ratio   1.6


 


Lipase   31














PD MEDICAL DECISION MAKING





- ED course


Complexity details: reviewed results, re-evaluated patient, considered 

differential, d/w patient, d/w family


ED course: 





Patient is well-appearing, nontoxic.  Afebrile.  Feels better after IV fluids 

and Zofran.  Drink 3 to 4 glasses of water in the emergency department.  

Ambulating well.  Appears to be a hangover from alcohol intoxication last night.

  We will place on Zofran for home and continue supportive care. Patient was 

given a dose of Protonix for alcohol induced gastritis. Patient will return if 

he worsens or fails to improve as expected.  Patient and family counseled 

regarding signs and symptoms for which I believe and urgent re-evaluation would 

be necessary. Patient with good understanding of and agreement to plan and is 

comfortable going home at this time





This document was made in part using voice recognition software. While efforts 

are made to proofread this document, sound alike and grammatical errors may 

occur.





Departure





- Departure


Disposition: 01 Home, Self Care


Clinical Impression: 


 Hangover without complication





Vomiting


Qualifiers:


 Vomiting type: unspecified Nausea presence: with nausea Qualified Code(s): 

R11.2 - Nausea with vomiting, unspecified





Gastritis


Qualifiers:


 Gastritis type: alcoholic Chronicity: acute Gastritis bleeding: without 

bleeding Qualified Code(s): K29.20 - Alcoholic gastritis without bleeding





Condition: Good


Instructions:  ED Nausea Vomiting, ED Alcohol Intoxication


Follow-Up: 


DONAL NGUYEN MD [Primary Care Provider] - As Needed


Prescriptions: 


Ondansetron Odt [Zofran] 4 mg TL Q6H PRN #10 tablet


 PRN Reason: Nausea / Vomiting


Comments: 


Please follow-up with your doctor as needed for further care.  You should be 

feeling better tomorrow.  You were sent home with Zofran tonight.  A 

prescription for Zofran was also sent to [facility] in [place].  Drink plenty of

 fluids, water, Gatorade.  Avoid alcohol.